# Patient Record
Sex: MALE | Race: WHITE
[De-identification: names, ages, dates, MRNs, and addresses within clinical notes are randomized per-mention and may not be internally consistent; named-entity substitution may affect disease eponyms.]

---

## 2018-06-04 ENCOUNTER — HOSPITAL ENCOUNTER (EMERGENCY)
Dept: HOSPITAL 17 - NEPC | Age: 58
LOS: 1 days | Discharge: ANOTHER HOSPITAL | End: 2018-06-05
Payer: COMMERCIAL

## 2018-06-04 VITALS
OXYGEN SATURATION: 97 % | SYSTOLIC BLOOD PRESSURE: 144 MMHG | HEART RATE: 94 BPM | DIASTOLIC BLOOD PRESSURE: 96 MMHG | TEMPERATURE: 98.6 F | RESPIRATION RATE: 17 BRPM

## 2018-06-04 VITALS — HEIGHT: 72 IN | WEIGHT: 171.96 LBS | BODY MASS INDEX: 23.29 KG/M2
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
## 2018-06-04 DIAGNOSIS — S68.123A: Primary | ICD-10-CM

## 2018-06-04 DIAGNOSIS — I10: ICD-10-CM
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
## 2018-06-04 DIAGNOSIS — Z23: ICD-10-CM

## 2018-06-04 PROCEDURE — 99285 EMERGENCY DEPT VISIT HI MDM: CPT

## 2018-06-04 PROCEDURE — 73130 X-RAY EXAM OF HAND: CPT

## 2018-06-04 PROCEDURE — 86850 RBC ANTIBODY SCREEN: CPT

## 2018-06-04 PROCEDURE — 85025 COMPLETE CBC W/AUTO DIFF WBC: CPT

## 2018-06-04 PROCEDURE — 80048 BASIC METABOLIC PNL TOTAL CA: CPT

## 2018-06-04 PROCEDURE — 85610 PROTHROMBIN TIME: CPT

## 2018-06-04 PROCEDURE — 85730 THROMBOPLASTIN TIME PARTIAL: CPT

## 2018-06-04 PROCEDURE — 86900 BLOOD TYPING SEROLOGIC ABO: CPT

## 2018-06-04 PROCEDURE — 90471 IMMUNIZATION ADMIN: CPT

## 2018-06-04 PROCEDURE — 86901 BLOOD TYPING SEROLOGIC RH(D): CPT

## 2018-06-04 PROCEDURE — 96375 TX/PRO/DX INJ NEW DRUG ADDON: CPT

## 2018-06-04 PROCEDURE — 90715 TDAP VACCINE 7 YRS/> IM: CPT

## 2018-06-04 PROCEDURE — 96374 THER/PROPH/DIAG INJ IV PUSH: CPT

## 2018-06-05 VITALS
DIASTOLIC BLOOD PRESSURE: 92 MMHG | OXYGEN SATURATION: 95 % | HEART RATE: 88 BPM | SYSTOLIC BLOOD PRESSURE: 152 MMHG | RESPIRATION RATE: 15 BRPM

## 2018-06-05 LAB
BASOPHILS # BLD AUTO: 0.1 TH/MM3 (ref 0–0.2)
BASOPHILS NFR BLD: 0.9 % (ref 0–2)
BUN SERPL-MCNC: 22 MG/DL (ref 7–18)
CALCIUM SERPL-MCNC: 9 MG/DL (ref 8.5–10.1)
CHLORIDE SERPL-SCNC: 108 MEQ/L (ref 98–107)
CREAT SERPL-MCNC: 1.58 MG/DL (ref 0.6–1.3)
EOSINOPHIL # BLD: 0.1 TH/MM3 (ref 0–0.4)
EOSINOPHIL NFR BLD: 1.2 % (ref 0–4)
ERYTHROCYTE [DISTWIDTH] IN BLOOD BY AUTOMATED COUNT: 13.8 % (ref 11.6–17.2)
GFR SERPLBLD BASED ON 1.73 SQ M-ARVRAT: 45 ML/MIN (ref 89–?)
GLUCOSE SERPL-MCNC: 141 MG/DL (ref 74–106)
HCO3 BLD-SCNC: 25.2 MEQ/L (ref 21–32)
HCT VFR BLD CALC: 40.6 % (ref 39–51)
HGB BLD-MCNC: 13.8 GM/DL (ref 13–17)
INR PPP: 1 RATIO
LYMPHOCYTES # BLD AUTO: 2.4 TH/MM3 (ref 1–4.8)
LYMPHOCYTES NFR BLD AUTO: 27 % (ref 9–44)
MCH RBC QN AUTO: 30.4 PG (ref 27–34)
MCHC RBC AUTO-ENTMCNC: 34.1 % (ref 32–36)
MCV RBC AUTO: 89.3 FL (ref 80–100)
MONOCYTE #: 0.9 TH/MM3 (ref 0–0.9)
MONOCYTES NFR BLD: 10.2 % (ref 0–8)
NEUTROPHILS # BLD AUTO: 5.4 TH/MM3 (ref 1.8–7.7)
NEUTROPHILS NFR BLD AUTO: 60.7 % (ref 16–70)
PLATELET # BLD: 221 TH/MM3 (ref 150–450)
PMV BLD AUTO: 8 FL (ref 7–11)
PROTHROMBIN TIME: 10.5 SEC (ref 9.8–11.6)
RBC # BLD AUTO: 4.54 MIL/MM3 (ref 4.5–5.9)
SODIUM SERPL-SCNC: 143 MEQ/L (ref 136–145)
WBC # BLD AUTO: 9 TH/MM3 (ref 4–11)

## 2018-06-05 NOTE — RADRPT
EXAM DATE:  6/5/2018 12:45 AM EDT

AGE/SEX:        57 years / Male



INDICATIONS:  Trauma to Lt hand.



CLINICAL DATA:  This is the patient's initial encounter. Patient reports that signs and symptoms have
 been present for 1 day and indicates a pain score of 2/10. 

                                                                          

MEDICAL/SURGICAL HISTORY:       None. None.



COMPARISON:      No prior [place] exams available for comparison.



FINDINGS:  

There is trauma and a fracture through the third proximal phalanx. The fracture is displaced. No defi
nite joint dislocation. The rest the bony structures are grossly intact.



CONCLUSION: 

Fracture or dislocation involving the third proximal phalanx with soft tissue injury.









Electronically signed by: Sarmad Askew MD  6/5/2018 1:10 AM EDT

## 2018-06-05 NOTE — PD
HPI


Chief Complaint:  Laceration/Skin Injury


Time Seen by Provider:  00:00


Travel History


International Travel<30 days:  No


Contact w/Intl Traveler<30days:  No


Traveled to known affect area:  No





History of Present Illness


HPI


The patient is a 57 year old male who presents to the [facility] emergency 

department with a history of left third digit injury with a skill saw prior to 

arrival.  The patient reports that he was [place] a [employer] [place] with [place] when 

he noticed that there was a piece of plastic hanging from it.  He used a battery

-powered skill saw in order to attempt to cut off the piece of plastic when he 

missed and cut his left third digit.  The patient has a near amputation to the 

left third digit reported by fire rescue and ambulance services.  This 

reportedly occurred at approximately 10:30 PM.  The patient has no sensation to 

the fingertip.  The patient had a bandage applied prior to arrival.  Bleeding 

was controlled.  The patient is unsure when he last had his tetanus updated.  

The patient denies having any other injuries associated with this.  On review 

of systems otherwise, he denies having any known recent fevers, cough or 

congestion, neck pain, chest pain, shortness of breath, abdominal pain, vomiting

, diarrhea, urinary symptoms, or other neurologic symptoms.





PFSH


Past Medical History


*** Narrative Medical


The patient's past medical history is significant for hypertension


Cardiovascular Problems:  Yes (HTN)


Hypertension:  Yes





Past Surgical History


*** Narrative Surgical


The patient's past surgical history is reportedly none.


Surgical History:  No Previous Surgery





Social History


Alcohol Use:  No


Tobacco Use:  No


Substance Use:  No





Allergies-Medications


(Allergen,Severity, Reaction):  


Coded Allergies:  


     Penicillins (Verified  Allergy, Unknown, 6/5/18)


     iodine (Verified  Allergy, Unknown, 6/5/18)


Reported Meds & Prescriptions





Reported Meds & Active Scripts


Active


Reported


[Bp Med]    PO DAILY








Review of Systems


Except as stated in HPI:  all other systems reviewed are Neg


General / Constitutional:  No: Fever


Eyes:  No: Visual changes


HENT:  No: Headaches


Cardiovascular:  No: Chest Pain or Discomfort


Respiratory:  No: Shortness of Breath


Gastrointestinal:  No: Abdominal Pain


Genitourinary:  No: Dysuria


Musculoskeletal:  Positive: Limited ROM, Pain


Skin:  No Rash


Neurologic:  No: Weakness


Psychiatric:  No: Depression


Endocrine:  No: Polydipsia


Hematologic/Lymphatic:  No: Easy Bruising





Physical Exam


Narrative


General: 


The patient is a well-developed well-nourished male in no acute distress. 





Head and Neck exam: 


Head is normocephalic atraumatic. 


Eyes: EOMI, pupils are equal round and reactive to light. 


Nose: Midline septum with pink mucous membranes 


Mouth: Dentition unremarkable. Moist mucus membranes. Posterior oropharynx is 

not erythematous. No tonsillar hypertrophy. Uvula midline. Airway patent. 


Neck: No palpable lymphadenopathy. No nuchal rigidity. No thyromegaly. 





Cardiovascular: 


Regular rate and rhythm without murmurs, gallops, or rubs. No pulse deficit to 

the extremities on simultaneous auscultation and palpation of his radial 

artery. 





Lungs: 


Clear to auscultation bilaterally. No wheezes, rhonchi, or rales.


 


Abdomen:


Soft, without tenderness to palpation in all 4 quadrants of the abdomen. No 

guarding, rebound, or rigidity.  Normal bowel sounds are audible.  No 

tenderness on palpation of McBurney's point.





Extremities: 


No clubbing, cyanosis, or edema. 2+ pulses in all 4 extremities.  The area of 

interest is the left third digit.  A bandage is in place which was gently 

removed.  The patient was noted to have a near amputation of the left third 

digit just distal to the PIP joint.  The patient's finger is held on by a piece 

of skin on the older side of the laceration.  The patient has no range of 

motion.  The patient's finger is held in flexion.  The patient is not able to 

extend his finger.  The patient has a second laceration involving the radial 

side of the finger pad near the DIP joint.  The patient has dried well over the 

finger pad making it difficult to assess for any capillary refill.  The patient 

has no significant sensation involving the finger pad.





Back: 


No spinous process tenderness to palpation. No costovertebral angle tenderness 

to palpation. 





Neurologic Exam: Grossly nonfocal, except for decreased sensation in the 

fingertip of the left third digit.





Skin Exam: No rash noted. Intact skin that is warm and dry.





Data


Data


Last Documented VS





Vital Signs








  Date Time  Temp Pulse Resp B/P (MAP) Pulse Ox O2 Delivery O2 Flow Rate FiO2


 


6/4/18 23:52 98.6 94 17 144/96 (112) 97   








Orders





 Orders


Complete Blood Count With Diff (6/5/18 00:05)


Basic Metabolic Panel (Bmp) (6/5/18 00:05)


Prothrombin Time / Inr (Pt) (6/5/18 00:05)


Act Partial Throm Time (Ptt) (6/5/18 00:05)


Iv Access Insert/Monitor (6/5/18 00:05)


Ecg Monitoring (6/5/18 00:05)


Oximetry (6/5/18 00:05)


Sodium Chlor 0.9% 1000 Ml Inj (Ns 1000 M (6/5/18 00:15)


Zdej-Lvn-Nixzkb (Booster) Inj (Boostrix (6/5/18 00:15)


Morphine Inj (Morphine Inj) (6/5/18 00:15)


Metoclopramide Inj (Reglan Inj) (6/5/18 00:15)


Type And Screen (6/5/18 00:05)


Hand, Complete ([id number]) (6/5/18 00:13)


Radiology Film Requests (6/5/18 )


Ed Discharge Order (6/5/18 01:42)





Labs





Laboratory Tests








Test


  6/5/18


00:18


 


White Blood Count 9.0 TH/MM3 


 


Red Blood Count 4.54 MIL/MM3 


 


Hemoglobin 13.8 GM/DL 


 


Hematocrit 40.6 % 


 


Mean Corpuscular Volume 89.3 FL 


 


Mean Corpuscular Hemoglobin 30.4 PG 


 


Mean Corpuscular Hemoglobin


Concent 34.1 % 


 


 


Red Cell Distribution Width 13.8 % 


 


Platelet Count 221 TH/MM3 


 


Mean Platelet Volume 8.0 FL 


 


Neutrophils (%) (Auto) 60.7 % 


 


Lymphocytes (%) (Auto) 27.0 % 


 


Monocytes (%) (Auto) 10.2 % 


 


Eosinophils (%) (Auto) 1.2 % 


 


Basophils (%) (Auto) 0.9 % 


 


Neutrophils # (Auto) 5.4 TH/MM3 


 


Lymphocytes # (Auto) 2.4 TH/MM3 


 


Monocytes # (Auto) 0.9 TH/MM3 


 


Eosinophils # (Auto) 0.1 TH/MM3 


 


Basophils # (Auto) 0.1 TH/MM3 


 


CBC Comment DIFF FINAL 


 


Differential Comment  


 


Prothrombin Time 10.5 SEC 


 


Prothromb Time International


Ratio 1.0 RATIO 


 


 


Activated Partial


Thromboplast Time 20.9 SEC 


 


 


Blood Urea Nitrogen 22 MG/DL 


 


Creatinine 1.58 MG/DL 


 


Random Glucose 141 MG/DL 


 


Calcium Level 9.0 MG/DL 


 


Sodium Level 143 MEQ/L 


 


Potassium Level 3.7 MEQ/L 


 


Chloride Level 108 MEQ/L 


 


Carbon Dioxide Level 25.2 MEQ/L 


 


Anion Gap 10 MEQ/L 


 


Estimat Glomerular Filtration


Rate 45 ML/MIN 


 











MDM


Medical Decision Making


Medical Screen Exam Complete:  Yes


Emergency Medical Condition:  Yes


Medical Record Reviewed:  Yes


Differential Diagnosis


Near finger amputation, versus open fracture, versus tendon laceration, versus 

neurovascular injury


Narrative Course


During the course of the patient's emergency department visit, the patient's 

history, examination, and differential diagnosis were reviewed with the 

patient. The patient was placed on a cardiac monitor with oximetry and frequent 

blood pressure monitoring. The patient had  IV access obtained and blood work 

sent for analysis. 





The patient was initially provided an update to his tetanus, clindamycin 600 mg 

IV due to his penicillin allergy, normal saline at 100 mL/h.  The patient was 

given morphine for pain, Reglan for nausea.  A call was placed out to the hand 

surgeon on call.  I spoke to Dr. Posada, the hand surgeon at approximately 

midnight.  She reports that the OR at this facility does not have the proper 

equipment for reimplantation of his third digit.  She reviewed pictures of the 

patient's injury and felt that the patient's best care would be to be seen at 

reimplantation facility such as [facility].  A call was placed out at [facility] for 

consideration of transfer.  An x-ray of the left hand was ordered.





The patient's laboratory studies were reviewed and remarkable for a white count 

of 9, hemoglobin 13.8, platelets 221 with 10.2 monocytes, PT 10.5, PTT 20.9.  

Basic metabolic profile reveals a chloride of 108, BUN 22, creatinine 1.58, 

glucose 141





Radiology studies were reviewed and remarkable for 


Last Impressions








Hand X-Ray 6/5/18 0013 Signed





Impressions: 





 CONCLUSION: 





 Fracture or dislocation involving the third proximal phalanx with soft tissue i





 njury.





  





 





  





 





  





 





  





 














I spoke to Dr. Judge, the hand surgeon at [facility] at approximately 1217.  After 

further discussion with him and review of pictures of the patient's hand injury 

he did accept the patient for transfer to the [facility] emergency department for 

evaluation by the hand surgery department.





The patient was agreeable with the plan to proceed with transfer to [facility].  The 

patient will be transferred by ambulance services emergently for





Diagnosis





 Primary Impression:  


 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


Referrals:  


Hand Surgeon


Patient Instructions:  Finger Amputation (ED), General Instructions


Disposition:  70 TRANSFER TO OTHER FACILITY ([facility])


Condition:  Ann Marie Alexandre MD Jun 5, 2018 00:16